# Patient Record
Sex: FEMALE | Race: WHITE | NOT HISPANIC OR LATINO | Employment: FULL TIME | URBAN - NONMETROPOLITAN AREA
[De-identification: names, ages, dates, MRNs, and addresses within clinical notes are randomized per-mention and may not be internally consistent; named-entity substitution may affect disease eponyms.]

---

## 2017-01-13 ENCOUNTER — CONSULT (OUTPATIENT)
Dept: CARDIOLOGY | Facility: CLINIC | Age: 52
End: 2017-01-13

## 2017-01-13 VITALS
HEART RATE: 62 BPM | DIASTOLIC BLOOD PRESSURE: 70 MMHG | BODY MASS INDEX: 19.91 KG/M2 | SYSTOLIC BLOOD PRESSURE: 102 MMHG | HEIGHT: 72 IN | WEIGHT: 147 LBS

## 2017-01-13 DIAGNOSIS — R00.1 BRADYCARDIA: Primary | ICD-10-CM

## 2017-01-13 PROCEDURE — 99244 OFF/OP CNSLTJ NEW/EST MOD 40: CPT | Performed by: INTERNAL MEDICINE

## 2017-01-13 PROCEDURE — 0296T ZIO PATCH: CPT | Performed by: INTERNAL MEDICINE

## 2017-01-13 RX ORDER — IBUPROFEN 200 MG
200 TABLET ORAL AS NEEDED
COMMUNITY
End: 2021-04-15

## 2017-01-13 RX ORDER — FEXOFENADINE HCL AND PSEUDOEPHEDRINE HCI 180; 240 MG/1; MG/1
1 TABLET, EXTENDED RELEASE ORAL AS NEEDED
COMMUNITY
End: 2021-04-15

## 2017-01-13 NOTE — PROGRESS NOTES
Peggy Kent  1965  [phone]  [phone]    01/13/17      [facility] CARDIOLOGY    Shola Scott MD  [address] / [place] [address]    Chief Complaint   Patient presents with   • Slow Heart Rate     Problem List:     1. Bradycardia   A. Echocardiogram 4/12/14: EF 55-60%, no significant valvular abnormalities   B. EKG 2014: sinus bradycardia 40 bpm   C. Apparent normal stress test - Dr. Jose > 5 years ago  2. Vasovagal Syncope   A. Syncopal episode in 2014- felt to be VVS exacerbated by dehydration   3. Varicose veins s/p vein stripping - [facility]  4. Hearing loss  5. History of Cipro induced myalgias and chronic pain  6. History of benign breast tumors  7. S/p wisdom teeth removal  8. D and C    History of Present Illness:   Ms Kent is referred to our care by Dr. Scott for bradycardia. She states that for years, her heart rate is in the 30s and 40s. She is a  for a living and is in very good shape. She works out heavily and can dead lift 200 lbs, do hour boot camp classes, and etc.  She does complain of being fatigued but is still able to do all of these activities. Sometimes, she states when she breathes in deep she feels a heaviness but does not have this with exertion. She was hospitalized in 2014 after a syncopal episode that was determined to be vasovagal syncope in the setting of dehydrated.  She has not had any further episodes since then, but occasionally has dizzy spells. Her BP is usually in the 90s systolic. She has not had a tilt table test or worn an event/holter monitor.  She is not sure when her thyroid was checked, in 2014 it was normal.     Allergies   Allergen Reactions   • Ciprofloxacin         Cannot display prior to admission medications because the patient has not been admitted in this contact.            Current Outpatient Prescriptions:   •  fexofenadine-pseudoephedrine (ALLEGRA-D 24) 180-240 MG per 24 hr  "tablet, Take 1 tablet by mouth As Needed for allergies., Disp: , Rfl:   •  ibuprofen (ADVIL,MOTRIN) 200 MG tablet, Take 200 mg by mouth As Needed for mild pain (1-3)., Disp: , Rfl:     Social History     Social History   • Marital status:      Spouse name: N/A   • Number of children: N/A   • Years of education: N/A     Social History Main Topics   • Smoking status: Never Smoker   • Smokeless tobacco: Never Used   • Alcohol use No   • Drug use: None   • Sexual activity: Defer     Other Topics Concern   • None     Social History Narrative       Family History   Problem Relation Age of Onset   • Heart disease Mother    • Hypertension Mother    • Heart disease Father    • Hypertension Father    Both parents had heart disease. Father had a MI, he was a smoker. Mother has congestive CHF    Review of Systems: Pertinent positives noted above in the HPI and problem list.  All other reviewed systems negative.     Vitals:    17 1105   BP: 102/70   BP Location: Right arm   Patient Position: Sitting   Pulse: 62   Weight: 147 lb (66.7 kg)   Height: 72\" (182.9 cm)       Physical Exam:  GEN: Well nourished, Well- developed  No acute distress  HEENT: Normocephalic, Atraumatic, PERRLA, moist mucous membranes  NECK: supple, NO JVD, no thyromegaly, no lymphadenopathy  CARD: S1S2  RRR no murmur, gallop, rub  LUNGS: Clear to auscultataion, normal respiratory effort  ABDOMEN: Soft, nontender, normal bowel sounds  EXTREMITIES:No gross deformities,  No clubbing, cyanosis, or edema  SKIN: Warm, dry  NEURO: No focal deficits  PSYCHIATRIC: Normal affect and mood      Data:                                      ECG 12 Lead  Date/Time: 2017 11:29 AM  Performed by: PAULETTE SAUL  Authorized by: PAULETTE SAUL   Rhythm: sinus rhythm  BPM: 62            EK Marked Sinus bradycardia 40 bpm      Assessment and Plan:   1. Bradycardia    Bradycardia with rates as low as 40 bpm in a very active/fit female. She has some mild " fatigue that may or may not be related to her heart rates. She is not any medications that would slow her heart rate down. Dr. Cochran has had a long discussion with the patient about bradycardia.  We would like for her to wear a Zio patch event monitor to look at her heart rates during activity/exercise to see if she can reach her target heart rate and rule out chronotropic incompetence.  Avoid OTC meds and supplements.  2. Vasovagal syncope- recommend hydration               Rosenda Shrestha PA-C scribing for Dr. Cochran  I, Stuart Cochran MD, personally performed the services described in this documentation as scribed by the above named individual in my presence, and it is both accurate and complete.  1/13/2017  12:10 PM

## 2017-02-17 ENCOUNTER — APPOINTMENT (OUTPATIENT)
Dept: WOMENS IMAGING | Facility: HOSPITAL | Age: 52
End: 2017-02-17

## 2017-02-17 PROCEDURE — 77063 BREAST TOMOSYNTHESIS BI: CPT | Performed by: RADIOLOGY

## 2017-02-17 PROCEDURE — G0202 SCR MAMMO BI INCL CAD: HCPCS | Performed by: RADIOLOGY

## 2017-02-28 ENCOUNTER — TELEPHONE (OUTPATIENT)
Dept: CARDIOLOGY | Facility: CLINIC | Age: 52
End: 2017-02-28

## 2017-02-28 NOTE — TELEPHONE ENCOUNTER
Patient called and left a message wanting to know the results of her Zio Patch. I tried to call her back. No answer. I left her a message.

## 2017-03-10 ENCOUNTER — TELEPHONE (OUTPATIENT)
Dept: CARDIOLOGY | Facility: CLINIC | Age: 52
End: 2017-03-10

## 2017-03-10 PROCEDURE — 0298T ZIO PATCH: CPT | Performed by: INTERNAL MEDICINE

## 2017-03-14 NOTE — TELEPHONE ENCOUNTER
Long discussion regarding holter results on Friday 3/10.  Dr. Cochran reviewed over weekend and agreed that results were ok for pt.  She exercises at 530 and is able to get her HR up.  Called and LM for pt on 3/13 that no chg to plan.

## 2017-03-20 ENCOUNTER — OFFICE VISIT (OUTPATIENT)
Dept: CARDIOLOGY | Facility: CLINIC | Age: 52
End: 2017-03-20

## 2017-03-20 DIAGNOSIS — R00.1 BRADYCARDIA: ICD-10-CM

## 2017-04-17 ENCOUNTER — OFFICE VISIT (OUTPATIENT)
Dept: GYNECOLOGIC ONCOLOGY | Facility: CLINIC | Age: 52
End: 2017-04-17

## 2017-04-17 VITALS
RESPIRATION RATE: 16 BRPM | OXYGEN SATURATION: 98 % | SYSTOLIC BLOOD PRESSURE: 102 MMHG | DIASTOLIC BLOOD PRESSURE: 56 MMHG | HEART RATE: 55 BPM | BODY MASS INDEX: 20.61 KG/M2 | TEMPERATURE: 97.9 F | WEIGHT: 152 LBS

## 2017-04-17 DIAGNOSIS — R14.0 BLOATING: ICD-10-CM

## 2017-04-17 DIAGNOSIS — R10.2 PELVIC PAIN: Primary | ICD-10-CM

## 2017-04-17 PROCEDURE — 99214 OFFICE O/P EST MOD 30 MIN: CPT | Performed by: NURSE PRACTITIONER

## 2017-04-19 NOTE — PROGRESS NOTES
GYN ONCOLOGY FOLLOW-UP    Peggy Kent  [id number]  1965    Chief Complaint: Follow-up (cramping for the whole month and especially before period and during. )        History of present illness:  Peggy Kent is a 51 y.o. year old female who is here today for c/o abdominal cramping and bloating. She originally initiated care with our office in 4/2015 for similar concerns along with AUB. She has a long-standing history of dysmenorrhea per her report and physician's notes. She had a 1.3 cm fibroid found on TUVS just prior to this time. She subsequently underwent hysteroscopy with D&C with Dr. Morgan on 4/20/2015 and pathology revealed fragments of benign squamous and endocervical glandular epithelium with focal benign endometrium. She recovered well from that procedure and was last seen for post-op visit on 5/7/2015.     Due to her long history of dysmenorrhea and her sister being diagnosed with endometriosis, she is very suspicious that this could be her problem. She has become more recently concerned, however, due to an increase in her pain along with abdominal bloating over the last 3-4 months. Due to this, she has returned to our office for further evaluation. She states over this time frame she has begun to have cramping all month long, that is most severed just prior to and during her menses. She also notices a worsening of the pain with core exercises. She is a previous part-time , but continues to exercise very regularly. She states she skipped her menses in October or November 2016 and feels she may be perimenopausal. She denies bothersome vasomotor symptoms. She began to notice abdominal swelling along with the cramping. She states she eats clean, but does notice that this is worse with certain foods. She denies severe pain or abdominal distention today. She has previously declined hormonal management for dysmenorrhea and states she is not interested in hormonal options today.  "    She completed her regular well woman exam with her regular provider 1 month ago that was negative. No abnormalities were found on exam at that time per her report. Her mammogram is UTD. She is scheduled for a colonoscopy with Dr. Jackson on 4/21/2017.         Past Medical History:   Diagnosis Date   • Abnormal uterine bleeding (AUB)     hx   • Dysmenorrhea        Past Surgical History:   Procedure Laterality Date   • BREAST CYST EXCISION      x2   • COMBINED HYSTEROSCOPY DIAGNOSTIC / D&C  04/20/2015   • VASCULAR SURGERY      [facility], [facility]   • WISDOM TOOTH EXTRACTION         MEDICATIONS: The current medication list was reviewed and reconciled.     Allergies:  is allergic to ciprofloxacin.    Family History   Problem Relation Age of Onset   • Heart disease Mother    • Hypertension Mother    • Heart disease Father    • Hypertension Father    • Breast cancer Cousin    • Melanoma Cousin        Review of Systems   Constitutional: Negative for fatigue, fever and unexpected weight change.   HENT: Negative for congestion, ear pain, hearing loss, sinus pressure and trouble swallowing.    Eyes: Negative for visual disturbance.   Respiratory: Negative for cough, chest tightness, shortness of breath and wheezing.    Cardiovascular: Negative for chest pain, palpitations and leg swelling.   Gastrointestinal: Positive for abdominal distention (occasional bloating x 3-4 months) and abdominal pain (\"cramping\"). Negative for constipation, diarrhea, nausea and vomiting.   Endocrine: Negative for cold intolerance, heat intolerance, polydipsia, polyphagia and polyuria.   Genitourinary: Positive for pelvic pain (wtih cramping, painful menses). Negative for difficulty urinating, dysuria, frequency, hematuria, urgency, vaginal bleeding, vaginal discharge and vaginal pain.   Musculoskeletal: Negative for arthralgias, gait problem, joint swelling and myalgias.   Skin: Negative for color change, pallor and rash.   Neurological: " Negative for dizziness, seizures, syncope, weakness, light-headedness, numbness and headaches.   Hematological: Negative for adenopathy. Does not bruise/bleed easily.   Psychiatric/Behavioral: Negative for agitation, confusion, sleep disturbance and suicidal ideas. The patient is not nervous/anxious.        Physical Exam  Vital Signs: /56  Pulse 55  Temp 97.9 °F (36.6 °C) (Temporal Artery )   Resp 16  Wt 152 lb (68.9 kg)  SpO2 98%  BMI 20.61 kg/m2   General Appearance:  alert, cooperative, no apparent distress, appears stated age and normal weight   Neurologic/Psychiatric: A&O x 3, gait steady, appropriate affect   HEENT:  Normocephalic, without obvious abnormality, mucous membranes moist   Neck: Supple, symmetrical, trachea midline, no adenopathy;  No thyromegaly, masses, or tenderness   Back:   Symmetric, no curvature, ROM normal, no CVA tenderness   Lungs:   Clear to auscultation bilaterally; respirations regular, even, and unlabored bilaterally   Heart:  Regular rate and rhythm, no murmurs appreciated   Breasts:  deferred   Abdomen:   Soft, no organomegaly. No distension upon exam. Generalized tenderness noted.    Lymph nodes: No cervical, supraclavicular, inguinal or axillary adenopathy noted   Extremities: Normal, atraumatic; no clubbing, cyanosis, or edema    Pelvic: External Genitalia  without lesions or skin changes  Vagina  is pink, moist, without lesions.   Cervix  normal, without lesions, no discharge and no CMT  Uterus  normal size, midposition and mobile. Mild tenderness upon bimanual exam. No tenderness upon uterine ballotment  Ovaries  without palpable masses or fullness  Parametria  smooth  Rectovaginal  Female rectovaginal: confirms no masses or bleeding       Procedure Notes:  No notes on file    Assessment and Plan:    Peggy was seen today for follow-up.    Diagnoses and all orders for this visit:    Pelvic pain  -       -     US Non-ob Transvaginal; Future    Bloating  -        -     US Non-ob Transvaginal; Future        Peggy and I discussed her lifelong dysmenorrhea and more recent symptoms. She is understanding that the only way to definitively diagnose endometriosis is through diagnostic laparoscopy. The standard treatments for both endometriosis and dysmenorrhea are hormonal, which she refuses at this time. We will plan to obtain CA-125 (outside lab per patient request) and TVUS for further evaluation and to rule out any other concerning abnormalities. She has general tenderness, but no distinct abnormalities upon exam. She was encouraged that as she is a thin woman, a large mass would be palpable and this was not found today. She prefers to have her ultrasound performed by Dr. Morgan. She will be scheduled to return here on MD's clinic day for study. She is also scheduled for colonoscopy later this month. This study may help give insight into her new symptoms as well. She will be notified of CA-125 results when they are obtained.     Return for TVUS with Dr. Morgan.      MARTHA Hooper      Note: Speech recognition transcription software was used to dictate portions of this document.  An attempt at proofreading has been made though minor errors in transcription may still be present.  Please do not hesitate to call our office with any questions.

## 2017-05-18 ENCOUNTER — OUTSIDE FACILITY SERVICE (OUTPATIENT)
Dept: GASTROENTEROLOGY | Facility: CLINIC | Age: 52
End: 2017-05-18

## 2017-05-18 PROCEDURE — G0121 COLON CA SCRN NOT HI RSK IND: HCPCS | Performed by: INTERNAL MEDICINE

## 2017-05-23 ENCOUNTER — OFFICE VISIT (OUTPATIENT)
Dept: GYNECOLOGIC ONCOLOGY | Facility: CLINIC | Age: 52
End: 2017-05-23

## 2017-05-23 VITALS
RESPIRATION RATE: 16 BRPM | HEART RATE: 45 BPM | WEIGHT: 151 LBS | DIASTOLIC BLOOD PRESSURE: 53 MMHG | SYSTOLIC BLOOD PRESSURE: 109 MMHG | BODY MASS INDEX: 20.48 KG/M2 | TEMPERATURE: 98.1 F | OXYGEN SATURATION: 97 %

## 2017-05-23 DIAGNOSIS — N94.6 DYSMENORRHEA: ICD-10-CM

## 2017-05-23 DIAGNOSIS — N83.201 BILATERAL OVARIAN CYSTS: ICD-10-CM

## 2017-05-23 DIAGNOSIS — R10.2 PELVIC PAIN: Primary | ICD-10-CM

## 2017-05-23 DIAGNOSIS — N95.1 PERIMENOPAUSAL: ICD-10-CM

## 2017-05-23 DIAGNOSIS — N83.202 BILATERAL OVARIAN CYSTS: ICD-10-CM

## 2017-05-23 DIAGNOSIS — R14.0 ABDOMINAL BLOATING: ICD-10-CM

## 2017-05-23 PROCEDURE — 99212 OFFICE O/P EST SF 10 MIN: CPT | Performed by: OBSTETRICS & GYNECOLOGY

## 2017-05-23 PROCEDURE — 76830 TRANSVAGINAL US NON-OB: CPT | Performed by: OBSTETRICS & GYNECOLOGY

## 2017-06-01 ENCOUNTER — TELEPHONE (OUTPATIENT)
Dept: GYNECOLOGIC ONCOLOGY | Facility: CLINIC | Age: 52
End: 2017-06-01

## 2017-06-01 NOTE — TELEPHONE ENCOUNTER
I called patient this afternoon to follow up on her status.  She reports some mild cramping a few days ago but did not require her ibuprofen.  She was able to do her regular exercises today, including core exercises and was without bothersome cramping.  She feels her symptoms are stable at this time and feels very reassured that her ultrasound was negative.  I informed her there are no routine follow-ups for this needed at this time, but we would be happy to see her when necessary or for general well woman GYN care if needed.  She verbalized understanding and was thankful for the call.

## 2017-06-01 NOTE — TELEPHONE ENCOUNTER
----- Message from Silvia Morgan MD sent at 5/23/2017  2:38 PM EDT -----  Reminder to call patient to discuss US/ f/u if indicated

## 2017-12-11 ENCOUNTER — TELEPHONE (OUTPATIENT)
Dept: CARDIOLOGY | Facility: CLINIC | Age: 52
End: 2017-12-11

## 2017-12-21 ENCOUNTER — TELEPHONE (OUTPATIENT)
Dept: CARDIOLOGY | Facility: CLINIC | Age: 52
End: 2017-12-21

## 2017-12-21 NOTE — TELEPHONE ENCOUNTER
Patient called to let us know that she went to her cardiologist in [place] c/o chest pain/tightness.He did an EKG and CXR, both of which were normal. She described her chest pain as positional and occasionally worse when she bends over.She also said that her chest tightens in a stressful situation. She said that her chest is sore and the bend of her left arm is sore as well. Since she exercises a lot, I explained to her that it may be a pulled muscle or it could be a slight panic attack. She is going to f/u again with her cardiologist and PCP.

## 2018-07-25 ENCOUNTER — APPOINTMENT (OUTPATIENT)
Dept: WOMENS IMAGING | Facility: HOSPITAL | Age: 53
End: 2018-07-25

## 2018-07-25 PROCEDURE — 77063 BREAST TOMOSYNTHESIS BI: CPT | Performed by: RADIOLOGY

## 2018-07-25 PROCEDURE — 77067 SCR MAMMO BI INCL CAD: CPT | Performed by: RADIOLOGY

## 2019-09-09 ENCOUNTER — APPOINTMENT (OUTPATIENT)
Dept: WOMENS IMAGING | Facility: HOSPITAL | Age: 54
End: 2019-09-09

## 2019-09-09 PROCEDURE — 77067 SCR MAMMO BI INCL CAD: CPT | Performed by: RADIOLOGY

## 2019-09-09 PROCEDURE — 77063 BREAST TOMOSYNTHESIS BI: CPT | Performed by: RADIOLOGY

## 2020-09-23 ENCOUNTER — APPOINTMENT (OUTPATIENT)
Dept: WOMENS IMAGING | Facility: HOSPITAL | Age: 55
End: 2020-09-23

## 2020-09-23 DIAGNOSIS — Z12.39 SCREENING FOR BREAST CANCER: Primary | ICD-10-CM

## 2020-09-23 PROCEDURE — 77067 SCR MAMMO BI INCL CAD: CPT | Performed by: RADIOLOGY

## 2020-09-23 PROCEDURE — 77063 BREAST TOMOSYNTHESIS BI: CPT | Performed by: RADIOLOGY

## 2020-09-25 ENCOUNTER — APPOINTMENT (OUTPATIENT)
Dept: WOMENS IMAGING | Facility: HOSPITAL | Age: 55
End: 2020-09-25

## 2021-04-15 ENCOUNTER — CONSULT (OUTPATIENT)
Dept: CARDIOLOGY | Facility: CLINIC | Age: 56
End: 2021-04-15

## 2021-04-15 VITALS
HEART RATE: 37 BPM | OXYGEN SATURATION: 99 % | SYSTOLIC BLOOD PRESSURE: 98 MMHG | WEIGHT: 154.2 LBS | HEIGHT: 72 IN | DIASTOLIC BLOOD PRESSURE: 60 MMHG | BODY MASS INDEX: 20.88 KG/M2

## 2021-04-15 DIAGNOSIS — R00.1 BRADYCARDIA, SINUS: Primary | ICD-10-CM

## 2021-04-15 PROCEDURE — 99243 OFF/OP CNSLTJ NEW/EST LOW 30: CPT | Performed by: PHYSICIAN ASSISTANT

## 2021-04-15 PROCEDURE — 93000 ELECTROCARDIOGRAM COMPLETE: CPT | Performed by: PHYSICIAN ASSISTANT

## 2021-04-15 NOTE — PROGRESS NOTES
[facility]  INITIAL OFFICE CONSULT      Peggy Kent  1965  PCP: Shola Scott MD    SUBJECTIVE:   Peggy Kent is a 55 y.o. female seen for a consultation visit regarding the following:     Chief Complaint: No chief complaint on file.         Consultation is requested by Shola Scott MD for evaluation of No chief complaint on file.        History:  55-year-old female presents today for consultation regarding bradycardia. She had a work up in the past for bradycardia that included a work up with normal Stress test and Echocardiogram. She had Zio monitor monitor revealing average HR 52 with max hr 162 with exertion. She states recently she is under severe stress with taking care of her family and the death of her father-in-law. She states she has been experiencing fatigue with this stress. 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 She is still able to get her HR up to 150 bpm range with activietes. She has no dizziness near syncope or syncope events. She denies any chest pain suggesting angina.       Cardiac PMH: (Old records have been reviewed and summarized below)  1. Bradycardia  a. Echocardiogram 2014 EF 60% no valvular heart disease  b. Normal stress test Dr. Gerebr Jose 2014  2. Vasovagal syncope  3. Remote varicose veins stripping with [facility]  4. Mild Hearing loss  5.  benign breast tumors        Past Medical History, Past Surgical History, Family history, Social History, and Medications were all reviewed with the patient today and updated as necessary.     Current Outpatient Medications   Medication Sig Dispense Refill   • fexofenadine-pseudoephedrine (ALLEGRA-D 24) 180-240 MG per 24 hr tablet Take 1 tablet by mouth As Needed for allergies.     • ibuprofen (ADVIL,MOTRIN) 200 MG tablet Take 200 mg by mouth As Needed for mild pain (1-3).       No current facility-administered medications  for this visit.     Allergies   Allergen Reactions   • Ciprofloxacin          Past Medical History:   Diagnosis Date   • Abnormal uterine bleeding (AUB)     hx   • Dysmenorrhea      Past Surgical History:   Procedure Laterality Date   • BREAST CYST EXCISION      x2   • COMBINED HYSTEROSCOPY DIAGNOSTIC / D & C  04/20/2015   • VASCULAR SURGERY      legs, [facility]   • WISDOM TOOTH EXTRACTION       Family History   Problem Relation Age of Onset   • Heart disease Mother    • Hypertension Mother    • Heart disease Father    • Hypertension Father    • Breast cancer Cousin    • Melanoma Cousin      Social History     Tobacco Use   • Smoking status: Never Smoker   • Smokeless tobacco: Never Used   Substance Use Topics   • Alcohol use: No       ROS:  Review of Symptoms:  General: no recent weight loss/gain, weakness or fatigue  Skin: no rashes, lumps, or other skin changes  HEENT: no dizziness, lightheadedness, or vision changes  Respiratory: no cough or hemoptysis  Cardiovascular: no palpitations, and tachycardia  Gastrointestinal: no black/tarry stools or diarrhea  Urinary: no change in frequency or urgency  Peripheral Vascular: no claudication or leg cramps  Musculoskeletal: no muscle or joint pain/stiffness  Psychiatric: no depression or excessive stress  Neurological: no sensory or motor loss, no syncope  Hematologic: no anemia, easy bruising or bleeding  Endocrine: no thyroid problems, nor heat or cold intolerance         PHYSICAL EXAM:   There were no vitals taken for this visit.     Wt Readings from Last 5 Encounters:   05/23/17 68.5 kg (151 lb)   04/17/17 68.9 kg (152 lb)   01/13/17 66.7 kg (147 lb)   10/28/16 66.4 kg (146 lb 6.4 oz)     BP Readings from Last 5 Encounters:   05/23/17 109/53   04/17/17 102/56   01/13/17 102/70       General-Well Nourished, Well developed  Eyes - PERRLA  Neck- supple, No mass  CV- regular rate and rhythm, no MRG  Lung- clear bilaterally  Abd- soft, +BS  Musc/skel - Norm  strength and range of motion  Skin- warm and dry  Neuro - Alert & Oriented x 3, appropriate mood.    Patient's external notes were reviewed.  Independent interpretation of test performed by another physician in facility were reviewed.  Outside laboratory data was also reviewed.    Medical problems and test results were reviewed with the patient today.     No results found for this or any previous visit.      No results found for: CHOL, HDL, HDLC, LDL, LDLC, VLDL    EKG:  (EKG/Tracing has been independently visualized by me and summarized below)      ECG 12 Lead    Date/Time: 4/15/2021 4:02 PM  Performed by: Kofi Adler PA  Authorized by: Kofi Adler PA   Rhythm: sinus bradycardia  Rate: bradycardic  Conduction: conduction normal  ST Segments: ST segments normal  T Waves: T waves normal    Clinical impression: abnormal EKG            ASSESSMENT   1.  Bradycardia: ZIO monitor 2017 with asymptomatic bradycardia average heart rate 50 bpm.  Heart rate increased to 130 with physical exercise.  2. Remote Vasovagal syncope-No recurrent events  3.  Remote negative stress test and echocardiogram thousand 17      PLAN  · Asymptomatic Bradycardia. If symptoms change she will pursue a repeat Zio monitor  · Return for follow up 3 months or sooner as needed.           Advance Care Planning   ACP discussion was held with the patient during this visit. Patient has an advance directive (not in EMR), copy requested.   Cardiology/Electrophysiology  04/15/21  15:00 EDT  Will Vitor LYONS

## 2021-04-16 ENCOUNTER — TELEPHONE (OUTPATIENT)
Dept: CARDIOLOGY | Facility: CLINIC | Age: 56
End: 2021-04-16

## 2024-09-11 ENCOUNTER — TELEPHONE (OUTPATIENT)
Dept: CARDIOLOGY | Facility: CLINIC | Age: 59
End: 2024-09-11
Payer: COMMERCIAL

## 2024-10-31 ENCOUNTER — PATIENT ROUNDING (BHMG ONLY) (OUTPATIENT)
Dept: CARDIOLOGY | Facility: CLINIC | Age: 59
End: 2024-10-31
Payer: COMMERCIAL

## 2024-10-31 ENCOUNTER — OFFICE VISIT (OUTPATIENT)
Dept: CARDIOLOGY | Facility: CLINIC | Age: 59
End: 2024-10-31
Payer: COMMERCIAL

## 2024-10-31 VITALS
HEIGHT: 72 IN | SYSTOLIC BLOOD PRESSURE: 98 MMHG | OXYGEN SATURATION: 98 % | HEART RATE: 43 BPM | BODY MASS INDEX: 20.99 KG/M2 | WEIGHT: 155 LBS | DIASTOLIC BLOOD PRESSURE: 56 MMHG

## 2024-10-31 DIAGNOSIS — R00.1 BRADYCARDIA, DRUG INDUCED: Primary | ICD-10-CM

## 2024-10-31 DIAGNOSIS — I47.10 SUSTAINED SVT: ICD-10-CM

## 2024-10-31 DIAGNOSIS — T50.905A BRADYCARDIA, DRUG INDUCED: Primary | ICD-10-CM

## 2024-10-31 PROBLEM — I95.1 ORTHOSTATIC HYPOTENSION: Status: ACTIVE | Noted: 2024-10-31

## 2024-10-31 RX ORDER — SODIUM CHLORIDE 1 G/1
1 TABLET ORAL 2 TIMES DAILY
Qty: 60 TABLET | Refills: 6 | Status: SHIPPED | OUTPATIENT
Start: 2024-10-31

## 2024-10-31 NOTE — PROGRESS NOTES
"[facility]  INITIAL OFFICE CONSULT      Peggy Kent  1965  PCP: Shola Scott MD    SUBJECTIVE:   Peggy Kent is a 59 y.o. female seen for a consultation visit regarding the following:     Chief Complaint:   Chief Complaint   Patient presents with    Atrial Fibrillation          Consultation is requested by Self Referring for evaluation of Atrial Fibrillation        History:  Pleasant 59-year-old female returns today follow-up regarding dizziness orthostatic hypotension bradycardia and recent Holter monitor.  Patient was recently evaluated by a local cardiologist Dr. Jose.  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  She is not having chest pain or chest tightness.  She is a avid exerciser to just exercise for 5 days a week.  She also noted she had some Apple Watch revealing reported \"A-fib\" therefore she wanted to have a monitor and be evaluated.  Holter monitor performed September 2024 revealed brief SVT episodes average heart rate 55 and max heart 154 when she was exercising.  It was recommended by her local cardiologist start midodrine but she was apprehensive about taking this.  She wanted a second opinion presents today for further evaluation.      Cardiac PMH: (Old records have been reviewed and summarized below)  Bradycardia, asymptomatic  Echocardiogram 2014 EF 60% no valvular heart disease  Normal stress test Dr. Gerber Jose 2014  SVT: Holter monitor revealing brief episode of SVT average heart rate 55 bpm max heart rate 154 bpm September 2024  Vasovagal syncope/orthostatic hypotension  Remote varicose veins stripping with [facility]  Mild Hearing loss   benign breast tumors      Past Medical History, Past Surgical History, Family history, Social History, and Medications were all " reviewed with the patient today and updated as necessary.     Current Outpatient Medications   Medication Sig Dispense Refill    Aspirin-Acetaminophen-Caffeine (EXCEDRIN MIGRAINE PO) Take 1-2 tablets by mouth As Needed.      Calcium Carb-Cholecalciferol (CALCIUM+D3 PO) Take 1 tablet by mouth As Needed. (Patient not taking: Reported on 10/31/2024)      Multiple Vitamins-Minerals (ZINC PO) Take 1 tablet by mouth As Needed. (Patient not taking: Reported on 10/31/2024)       No current facility-administered medications for this visit.     Allergies   Allergen Reactions    Ciprofloxacin          Past Medical History:   Diagnosis Date    Abnormal uterine bleeding (AUB)     hx    Atrial fibrillation     Dysmenorrhea      Past Surgical History:   Procedure Laterality Date    BREAST CYST EXCISION      x2    COMBINED HYSTEROSCOPY DIAGNOSTIC / D & C  04/20/2015    VASCULAR SURGERY      legs, [facility]    WISDOM TOOTH EXTRACTION       Family History   Problem Relation Age of Onset    Heart disease Mother     Hypertension Mother     Heart attack Mother     Heart disease Father     Hypertension Father     Heart attack Father         massive    Breast cancer Cousin     Melanoma Cousin     No Known Problems Sister      Social History     Tobacco Use    Smoking status: Never    Smokeless tobacco: Never   Substance Use Topics    Alcohol use: No       ROS:  Review of Symptoms:  General: no recent weight loss/gain, weakness or fatigue  Skin: no rashes, lumps, or other skin changes  HEENT: + dizziness, lightheadedness,   Respiratory: no cough or hemoptysis  Cardiovascular: + palpitations, and tachycardia  Gastrointestinal: no black/tarry stools or diarrhea  Urinary: no change in frequency or urgency  Peripheral Vascular: no claudication or leg cramps  Musculoskeletal: no muscle or joint pain/stiffness  Psychiatric: no depression or excessive stress  Neurological: no sensory or motor loss, no syncope  Hematologic: no anemia, easy  "bruising or bleeding  Endocrine: no thyroid problems, nor heat or cold intolerance         PHYSICAL EXAM:   BP 98/56 (BP Location: Right arm, Patient Position: Sitting)   Pulse (!) 43   Ht 182.9 cm (72\")   Wt 70.3 kg (155 lb)   SpO2 98%   BMI 21.02 kg/m²      Wt Readings from Last 5 Encounters:   10/31/24 70.3 kg (155 lb)   04/15/21 69.9 kg (154 lb 3.2 oz)   05/23/17 68.5 kg (151 lb)   04/17/17 68.9 kg (152 lb)   01/13/17 66.7 kg (147 lb)     BP Readings from Last 5 Encounters:   10/31/24 98/56   04/15/21 98/60   05/23/17 109/53   04/17/17 102/56   01/13/17 102/70       General-Well Nourished, Well developed  Eyes - PERRLA  Neck- supple, No mass  CV- regular rate and rhythm, no MRG  Lung- clear bilaterally  Abd- soft, +BS  Musc/skel - Norm strength and range of motion  Skin- warm and dry  Neuro - Alert & Oriented x 3, appropriate mood.    Patient's external notes were reviewed.  Independent interpretation of test performed by another physician in facility were reviewed.  Outside laboratory data was also reviewed.    Medical problems and test results were reviewed with the patient today.     EKG:  (EKG/Tracing has been independently visualized by me and summarized below)      ECG 12 Lead    Date/Time: 10/31/2024 2:46 PM  Performed by: Kofi Adler PA    Authorized by: Kofi Adler PA  Comparison: not compared with previous ECG   Rhythm: sinus bradycardia  Rate: bradycardic  Conduction: conduction normal  ST Segments: ST segments normal  QRS axis: left    Clinical impression: non-specific ECG          ASSESSMENT   1. SVT-AT    2. Asymptomatic Bradycardia    3. Marginal Bp, Dysautonomia      PLAN  Reviewed Holter monitor with patient reveals brief atrial tachycardia.  She has known asymptomatic bradycardia.  Her main symptoms complaint is that she has a lot of orthostatic hypotension.  Her local cardiologist prescribed midodrine but she is concerned about take this medication.  I have recommended she " can take midodrine I think this is appropriate therapy but she is apprehensive at this time.    Other option I told her is to consider taking sodium chloride tablets 1 g twice daily and crease Gatorade Powerade see if this proves her blood pressure orthostasis.  Lower extremity compression.  Long-term regarding atrial tachycardia this is minimal at this time do not recommend any further treatment.  She would like to follow-up in [place] this is easier for her in 6 months she has requested Dr. Mcduffie.         Cardiology/Electrophysiology  10/31/24  14:13 EDT  Electronically signed by JOSEF Lawrence, 10/31/24, 2:48 PM EDT.

## 2024-10-31 NOTE — PROGRESS NOTES
October 31, 2024    Hello, may I speak with Peggy Kent?    My name is EDOUARD BLUE      I am  with [facility] CARDIOLOGY  [address]  [address]  [place] [address] [phone].    Before we get started may I verify your date of birth? 1965    I am calling to officially welcome you to our practice and ask about your recent visit. Is this a good time to talk? yes    Tell me about your visit with us. What things went well?  WE HAVE A SOLUTION FOR SHORT TERM FOR CURRENT SITUATION AND PLANS FOR LONG TERM GOALS.        We're always looking for ways to make our patients' experiences even better. Do you have recommendations on ways we may improve?  yes, I HAVE BEEN HERE BEFORE BUT FROM WHERE IT HAD BEEN SO LONG, I WAS CONSIDERED A NEW PATIENT AND IT WAS HARD FOR ME TO GET AN APPOINTMENT AND I DO NOT THINK THAT WAS A GOOD THIN.G    Overall were you satisfied with your first visit to our practice? yes       I appreciate you taking the time to speak with me today. Is there anything else I can do for you? no      Thank you, and have a great day.

## 2025-05-08 ENCOUNTER — OFFICE VISIT (OUTPATIENT)
Dept: CARDIOLOGY | Facility: CLINIC | Age: 60
End: 2025-05-08
Payer: COMMERCIAL

## 2025-05-08 VITALS
BODY MASS INDEX: 21.51 KG/M2 | HEART RATE: 39 BPM | SYSTOLIC BLOOD PRESSURE: 102 MMHG | WEIGHT: 158.8 LBS | DIASTOLIC BLOOD PRESSURE: 70 MMHG | HEIGHT: 72 IN | OXYGEN SATURATION: 99 %

## 2025-05-08 DIAGNOSIS — I47.10 SUSTAINED SVT: Chronic | ICD-10-CM

## 2025-05-08 DIAGNOSIS — R00.1 BRADYCARDIA, SINUS: Primary | Chronic | ICD-10-CM

## 2025-05-08 RX ORDER — CHOLECALCIFEROL (VITAMIN D3) 25 MCG
1000 TABLET ORAL DAILY
COMMUNITY

## 2025-05-08 RX ORDER — LORATADINE 10 MG/1
10 TABLET ORAL DAILY
COMMUNITY

## 2025-05-08 NOTE — PROGRESS NOTES
"Peggy Kent  [id number]  1965  [phone]      [facility] CARDIOLOGY     Referring Provider: No ref. provider found     Shola Scott MD  [address]  [place] [address]    Chief Complaint   Patient presents with    Slow Heart Rate     Pt denies current symptoms during triage.        Problem List  Atrial tachycardia  Asymptomatic bradycardia   for 18 years  HR in 30s for the past few years  TTE 2024: normal function  Venous insufficiency s/p surgery at [facility]      History of Present Illness   Peggy Kent is a 59 y.o. female who presents to my electrophysiology clinic for follow up of bradycardia and AT.  Patient is a  for the past 18 years and is very active. Her heart rate at baseline is around 30s. Completely asymptomatic- no syncope/fatigue. For her orthostatic hypotension she is doing better since adding salt to her diet.     Outpatient Medications Marked as Taking for the 5/8/25 encounter (Office Visit) with Carmine Mcduffie MD   Medication Sig Dispense Refill    Aspirin-Acetaminophen-Caffeine (EXCEDRIN MIGRAINE PO) Take 1-2 tablets by mouth As Needed.      Biotin w/ Vitamins C & E (HAIR SKIN & NAILS GUMMIES PO) Take 1 dose by mouth Daily.      Cholecalciferol 25 MCG (1000 UT) tablet Take 1 tablet by mouth Daily.      loratadine (Claritin) 10 MG tablet Take 1 tablet by mouth Daily.              Physical Exam  Vitals:    05/08/25 1342 05/08/25 1347   BP: 100/68 102/70   BP Location: Right arm Left arm   Patient Position: Sitting Sitting   Cuff Size: Adult Adult   Pulse: (!) 39    SpO2: 99%    Weight: 72 kg (158 lb 12.8 oz)    Height: 182.9 cm (72\")      Body mass index is 21.54 kg/m².  Vitals and nursing note reviewed.   Constitutional:       Appearance: Healthy appearance.   HENT:      Head: Normocephalic and atraumatic.      Nose: Nose normal.   Neck:      Vascular: No JVD.   Pulmonary:      Effort: Pulmonary effort is normal.      Breath " "sounds: Normal breath sounds.   Cardiovascular:      PMI at left midclavicular line. Normal rate. Regular rhythm. Normal S1. Normal S2.       Murmurs: There is no murmur.      No gallop.    Edema:     Peripheral edema absent.   Skin:     General: Skin is warm and dry.   Neurological:      Mental Status: Oriented to person, place and time.   Psychiatric:         Behavior: Behavior normal.          Diagnostic Data  Procedures    Marked SB 38bpm   QRS 78 QTc 375    Lab Results   Component Value Date    GLUCOSE 83 04/14/2015    CALCIUM 9.5 04/14/2015     04/14/2015    K 4.3 04/14/2015    CO2 29 04/14/2015     04/14/2015    BUN 26 (H) 04/14/2015    CREATININE 1.0 04/14/2015    ANIONGAP 4 04/14/2015     Lab Results   Component Value Date    WBC 4.70 04/14/2015    HGB 12.4 04/14/2015    HCT 36.6 04/14/2015    MCV 99.7 (H) 04/14/2015     04/14/2015     No results found for: \"INR\", \"PROTIME\"  No results found for: \"TSH\", \"K4TRTQK\", \"H6MGZEQ\", \"THYROIDAB\"    I personally viewed and interpreted the patient's EKG/Telemetry/lab data    Peggy CLYDE Kent  reports that she has never smoked. She has never used smokeless tobacco. I have educated her on the risk of diseases from using tobacco products such as cancer, COPD, and heart disease.     I spent 3  minutes counseling the patient.       ACP discussion was declined by the patient. Patient does not have an advance directive, declines further assistance.    Assessment and Plan  Diagnoses and all orders for this visit:    1. Bradycardia, sinus (Primary)    2. Sustained SVT      Sinus bradycardia  - asymptomatic  - she is currently exercising regularly without symptoms  - continue to monitor. No indication for pacemaker at this time    Atrial tachycardia  - appears to be infrequent in nature  - continue to monitor    Follow-Up  Return in about 6 months (around 11/8/2025).      Thank you for allowing me to participate in the care of your patient. Please to not " hesitate to contact me with additional questions or concerns.